# Patient Record
Sex: FEMALE | Race: WHITE | NOT HISPANIC OR LATINO | Employment: FULL TIME | URBAN - NONMETROPOLITAN AREA
[De-identification: names, ages, dates, MRNs, and addresses within clinical notes are randomized per-mention and may not be internally consistent; named-entity substitution may affect disease eponyms.]

---

## 2023-01-19 ENCOUNTER — OFFICE VISIT (OUTPATIENT)
Dept: SURGERY | Facility: CLINIC | Age: 63
End: 2023-01-19
Payer: COMMERCIAL

## 2023-01-19 VITALS
DIASTOLIC BLOOD PRESSURE: 88 MMHG | HEART RATE: 104 BPM | OXYGEN SATURATION: 99 % | SYSTOLIC BLOOD PRESSURE: 140 MMHG | TEMPERATURE: 98.2 F | WEIGHT: 210 LBS | BODY MASS INDEX: 37.21 KG/M2 | HEIGHT: 63 IN

## 2023-01-19 DIAGNOSIS — D17.21 LIPOMA OF RIGHT UPPER EXTREMITY: ICD-10-CM

## 2023-01-19 DIAGNOSIS — D17.23 LIPOMA OF RIGHT LOWER EXTREMITY: ICD-10-CM

## 2023-01-19 DIAGNOSIS — D17.22 LIPOMA OF LEFT UPPER EXTREMITY: Primary | ICD-10-CM

## 2023-01-19 DIAGNOSIS — E66.09 CLASS 2 OBESITY DUE TO EXCESS CALORIES WITHOUT SERIOUS COMORBIDITY WITH BODY MASS INDEX (BMI) OF 37.0 TO 37.9 IN ADULT: ICD-10-CM

## 2023-01-19 PROCEDURE — 99204 OFFICE O/P NEW MOD 45 MIN: CPT | Performed by: STUDENT IN AN ORGANIZED HEALTH CARE EDUCATION/TRAINING PROGRAM

## 2023-01-19 RX ORDER — HEPARIN SODIUM 5000 [USP'U]/ML
5000 INJECTION, SOLUTION INTRAVENOUS; SUBCUTANEOUS ONCE
Status: CANCELLED | OUTPATIENT
Start: 2023-01-19 | End: 2023-01-19

## 2023-01-19 RX ORDER — ASPIRIN 81 MG/1
TABLET, COATED ORAL
COMMUNITY
Start: 2022-12-13

## 2023-01-19 RX ORDER — LISINOPRIL AND HYDROCHLOROTHIAZIDE 20; 12.5 MG/1; MG/1
TABLET ORAL
COMMUNITY
Start: 2023-01-03

## 2023-01-19 RX ORDER — LEVOTHYROXINE SODIUM 112 MCG
TABLET ORAL
COMMUNITY
Start: 2023-01-03

## 2023-01-19 NOTE — PATIENT INSTRUCTIONS

## 2023-01-19 NOTE — PROGRESS NOTES
"Office New Patient History and Physical:     Referring Provider: Mor Altamirano MD    Chief Complaint   Patient presents with   • lipomas     Mrs. Borden is here for a consult on her lipomas.       Subjective .     History of present illness:  Marii Borden is a 62 y.o. female who presents with lipomas on her right forearm, two in her right upper leg, and bilateral upper arms. These have been present x 20 years. The one on her right forearm is getting better. They are sore and bother her. She has a family history of lipomas     She is a non-smoker. Her BMI is 37. She is on an 81mg ASA daily.      History  History reviewed. No pertinent past medical history.,   Past Surgical History:   Procedure Laterality Date   • APPENDECTOMY     • GALLBLADDER SURGERY     • GASTRIC BYPASS     • HYSTERECTOMY     , History reviewed. No pertinent family history.,   Social History     Tobacco Use   • Smoking status: Never   • Smokeless tobacco: Never   Vaping Use   • Vaping Use: Never used   Substance Use Topics   • Alcohol use: Defer   • Drug use: Defer   , (Not in a hospital admission)   and Allergies:  Penicillins    Current Outpatient Medications:   •  Aspirin Low Dose 81 MG EC tablet, , Disp: , Rfl:   •  lisinopril-hydrochlorothiazide (PRINZIDE,ZESTORETIC) 20-12.5 MG per tablet, , Disp: , Rfl:   •  Synthroid 112 MCG tablet, , Disp: , Rfl:     Objective     Vital Signs   /88 (BP Location: Left arm, Patient Position: Sitting, Cuff Size: Adult)   Pulse 104   Temp 98.2 °F (36.8 °C)   Ht 160 cm (63\")   Wt 95.3 kg (210 lb)   SpO2 99%   BMI 37.20 kg/m²      Physical Exam:  General appearance - alert, well appearing, and in no distress  Mental status - alert, oriented to person, place, and time  Eyes - pupils equal and reactive, extraocular eye movements intact  Neck - supple, no significant adenopathy  Chest - clear to auscultation, no wheezes, rales or rhonchi, symmetric air entry  Heart - normal rate, regular " rhythm  Abdomen - soft, nontender, nondistended, no masses or organomegaly  Neurological - alert, oriented, normal speech, no focal findings or movement disorder noted  Musculoskeletal - no joint tenderness, deformity or swelling  Skin - Right forearm 3.5 cm x 2cm, right upper arm 2cm, x 1 cm, left upper arm 2 cm x 1 cm, 4cm x 1.5 cm, 2cm x 1 cm     Results Review:    The following data was reviewed by: Vero Grant MD on 01/19/2023:    PROGRESS NOTES - SCAN by NEW ONBASE, CENTRAL (01/09/2023 00:00)    Assessment & Plan       Diagnoses and all orders for this visit:    1. Lipoma of left upper extremity (Primary)  Overview:  Added automatically from request for surgery [id number]    Orders:  -     Case Request; Standing  -     CBC (No diff); Future  -     Comprehensive metabolic panel; Future  -     ECG 12 Lead; Future  -     XR chest 1 vw; Future  -     heparin (porcine) 5000 UNIT/ML injection 5,000 Units  -     clindamycin (CLEOCIN) 600 mg in dextrose (D5W) 5 % 100 mL IVPB  -     Case Request    2. Lipoma of right upper extremity  Overview:  Added automatically from request for surgery [id number]    Orders:  -     Case Request; Standing  -     CBC (No diff); Future  -     Comprehensive metabolic panel; Future  -     ECG 12 Lead; Future  -     XR chest 1 vw; Future  -     heparin (porcine) 5000 UNIT/ML injection 5,000 Units  -     clindamycin (CLEOCIN) 600 mg in dextrose (D5W) 5 % 100 mL IVPB  -     Case Request    3. Lipoma of right lower extremity  Overview:  Added automatically from request for surgery [id number]    Orders:  -     Case Request; Standing  -     CBC (No diff); Future  -     Comprehensive metabolic panel; Future  -     ECG 12 Lead; Future  -     XR chest 1 vw; Future  -     heparin (porcine) 5000 UNIT/ML injection 5,000 Units  -     clindamycin (CLEOCIN) 600 mg in dextrose (D5W) 5 % 100 mL IVPB  -     Case Request    4. Class 2 obesity due to excess calories without serious comorbidity with body mass  index (BMI) of 37.0 to 37.9 in adult    Other orders  -     Follow Anesthesia Guidelines / Protocol; Future  -     Obtain Informed Consent; Future  -     Provide NPO Instructions to Patient; Future  -     Chlorhexidine Skin Prep; Future  -     Follow Anesthesia Guidelines / Protocol; Standing  -     Verify / Perform Chlorhexidine Skin Prep; Standing  -     Verify / Perform Chlorhexidine Skin Prep if Indicated (If Not Already Completed); Standing  -     Instructions on coughing, deep breathing, and incentive spirometry.; Standing  -     Oxygen Therapy-; Standing  -     Notify physician (specify); Standing     Ms. Borden is a 62 year old female with five symptomatic lipomas that are enlarging and uncomfortable. She would like them excised. We discussed that this does not prevent lipomas from recurring in other places. We discussed risks of excision including bleeding, infection, damage to surrounding structures and lipoma recurrence. We discussed doing this in the office under local vs. In the OR under MAC and she would like to do it in the OR. She is scheduled for 2/2/23. I have reviewed the note above. These are chronic problems with progression as they are growing. She is at increased risk of perioperative complications 2/2 her elevated BMI.      Class 2 Severe Obesity (BMI >=35 and <=39.9). Obesity-related health conditions include the following: hypertension. Obesity is unchanged. BMI is is above average; BMI management plan is completed. We discussed portion control and increasing exercise.      Vero Grant MD  01/20/23  12:00 CST

## 2023-03-21 ENCOUNTER — OFFICE VISIT (OUTPATIENT)
Age: 63
End: 2023-03-21
Payer: COMMERCIAL

## 2023-03-21 VITALS
WEIGHT: 207 LBS | DIASTOLIC BLOOD PRESSURE: 68 MMHG | TEMPERATURE: 97.1 F | HEIGHT: 67 IN | HEART RATE: 97 BPM | OXYGEN SATURATION: 96 % | RESPIRATION RATE: 20 BRPM | BODY MASS INDEX: 32.49 KG/M2 | SYSTOLIC BLOOD PRESSURE: 110 MMHG

## 2023-03-21 DIAGNOSIS — J06.9 UPPER RESPIRATORY TRACT INFECTION, UNSPECIFIED TYPE: Primary | ICD-10-CM

## 2023-03-21 PROCEDURE — 99203 OFFICE O/P NEW LOW 30 MIN: CPT | Performed by: NURSE PRACTITIONER

## 2023-03-21 RX ORDER — LEVOTHYROXINE SODIUM 112 UG/1
112 TABLET ORAL DAILY
COMMUNITY

## 2023-03-21 RX ORDER — METHYLPREDNISOLONE 4 MG/1
TABLET ORAL
Qty: 1 KIT | Refills: 0 | Status: SHIPPED | OUTPATIENT
Start: 2023-03-21 | End: 2023-03-27

## 2023-03-21 ASSESSMENT — ENCOUNTER SYMPTOMS
STRIDOR: 0
SHORTNESS OF BREATH: 0
SORE THROAT: 1
ABDOMINAL PAIN: 0
TROUBLE SWALLOWING: 0
EYE PAIN: 0
ABDOMINAL DISTENTION: 0
WHEEZING: 0
COUGH: 1
CHEST TIGHTNESS: 0
COLOR CHANGE: 0
SINUS PRESSURE: 0
EYE DISCHARGE: 0

## 2023-03-21 NOTE — PROGRESS NOTES
for this visit. Allergies   Allergen Reactions    Penicillins Hives       Health Maintenance   Topic Date Due    COVID-19 Vaccine (1) Never done    Depression Screen  Never done    HIV screen  Never done    Hepatitis C screen  Never done    DTaP/Tdap/Td vaccine (1 - Tdap) Never done    Cervical cancer screen  Never done    Lipids  Never done    Colorectal Cancer Screen  Never done    Breast cancer screen  Never done    Shingles vaccine (1 of 2) Never done    Flu vaccine (1) Never done    Hepatitis A vaccine  Aged Out    Hib vaccine  Aged Out    Meningococcal (ACWY) vaccine  Aged Out    Pneumococcal 0-64 years Vaccine  Aged Out       Subjective:   Review of Systems   Constitutional:  Positive for fatigue. Negative for chills and fever. HENT:  Positive for congestion, ear pain and sore throat. Negative for sinus pressure and trouble swallowing. Eyes:  Negative for pain and discharge. Respiratory:  Positive for cough. Negative for chest tightness, shortness of breath, wheezing and stridor. Cardiovascular:  Negative for chest pain and palpitations. Gastrointestinal:  Negative for abdominal distention and abdominal pain. Genitourinary:  Negative for difficulty urinating, dysuria and hematuria. Musculoskeletal:  Negative for arthralgias, neck pain and neck stiffness. Skin:  Negative for color change and rash. Neurological:  Negative for dizziness, syncope, speech difficulty, weakness and numbness. Psychiatric/Behavioral:  Negative for confusion and suicidal ideas. Objective    Physical Exam  Vitals and nursing note reviewed. Constitutional:       General: She is not in acute distress. Appearance: Normal appearance. HENT:      Head: Normocephalic. Right Ear: Tympanic membrane, ear canal and external ear normal.      Left Ear: Ear canal and external ear normal.      Ears:      Comments: Left TM yellow     Nose: Rhinorrhea present. No congestion.       Mouth/Throat:      Mouth:

## 2023-03-21 NOTE — PATIENT INSTRUCTIONS
Encourage fluids, Tylenol/Ibuprofen, OTC decongestants   Medrol sent to pharmacy.   If symptoms worsen or fail to improve follow-up with PCP  If SOB, chest pain, or high persistent fevers occur, go to ER    Patient verbalized understanding and agrees to plan

## 2023-11-17 ENCOUNTER — APPOINTMENT (OUTPATIENT)
Dept: GENERAL RADIOLOGY | Age: 63
End: 2023-11-17
Payer: COMMERCIAL

## 2023-11-17 ENCOUNTER — APPOINTMENT (OUTPATIENT)
Dept: CT IMAGING | Age: 63
End: 2023-11-17
Payer: COMMERCIAL

## 2023-11-17 ENCOUNTER — OFFICE VISIT (OUTPATIENT)
Age: 63
End: 2023-11-17
Payer: COMMERCIAL

## 2023-11-17 ENCOUNTER — HOSPITAL ENCOUNTER (EMERGENCY)
Age: 63
Discharge: HOME | End: 2023-11-17
Attending: EMERGENCY MEDICINE
Payer: COMMERCIAL

## 2023-11-17 VITALS
OXYGEN SATURATION: 96 % | HEART RATE: 78 BPM | SYSTOLIC BLOOD PRESSURE: 126 MMHG | RESPIRATION RATE: 18 BRPM | TEMPERATURE: 98.3 F | DIASTOLIC BLOOD PRESSURE: 76 MMHG

## 2023-11-17 VITALS
HEART RATE: 76 BPM | SYSTOLIC BLOOD PRESSURE: 120 MMHG | WEIGHT: 206 LBS | BODY MASS INDEX: 32.33 KG/M2 | RESPIRATION RATE: 20 BRPM | DIASTOLIC BLOOD PRESSURE: 72 MMHG | OXYGEN SATURATION: 97 % | TEMPERATURE: 98.3 F | HEIGHT: 67 IN

## 2023-11-17 DIAGNOSIS — R14.0 ABDOMINAL BLOATING: ICD-10-CM

## 2023-11-17 DIAGNOSIS — Z98.84 PERSONAL HISTORY OF GASTRIC BYPASS: ICD-10-CM

## 2023-11-17 DIAGNOSIS — R10.13 ABDOMINAL PAIN, EPIGASTRIC: Primary | ICD-10-CM

## 2023-11-17 DIAGNOSIS — R10.84 GENERALIZED ABDOMINAL PAIN: Primary | ICD-10-CM

## 2023-11-17 LAB
ALBUMIN SERPL-MCNC: 4.6 G/DL (ref 3.5–5.2)
ALP SERPL-CCNC: 112 U/L (ref 35–104)
ALT SERPL-CCNC: 23 U/L (ref 5–33)
ANION GAP SERPL CALCULATED.3IONS-SCNC: 11 MMOL/L (ref 7–19)
AST SERPL-CCNC: 20 U/L (ref 5–32)
BASOPHILS # BLD: 0.1 K/UL (ref 0–0.2)
BASOPHILS NFR BLD: 1 % (ref 0–1)
BILIRUB SERPL-MCNC: 0.4 MG/DL (ref 0.2–1.2)
BILIRUB UR QL STRIP: NEGATIVE
BUN SERPL-MCNC: 16 MG/DL (ref 8–23)
CALCIUM SERPL-MCNC: 10.3 MG/DL (ref 8.8–10.2)
CHLORIDE SERPL-SCNC: 99 MMOL/L (ref 98–111)
CLARITY UR: CLEAR
CO2 SERPL-SCNC: 30 MMOL/L (ref 22–29)
COLOR UR: YELLOW
CREAT SERPL-MCNC: 1 MG/DL (ref 0.5–0.9)
EOSINOPHIL # BLD: 0.2 K/UL (ref 0–0.6)
EOSINOPHIL NFR BLD: 2.5 % (ref 0–5)
ERYTHROCYTE [DISTWIDTH] IN BLOOD BY AUTOMATED COUNT: 12.7 % (ref 11.5–14.5)
GLUCOSE SERPL-MCNC: 103 MG/DL (ref 74–109)
GLUCOSE UR STRIP.AUTO-MCNC: NEGATIVE MG/DL
HCT VFR BLD AUTO: 45.5 % (ref 37–47)
HGB BLD-MCNC: 14.9 G/DL (ref 12–16)
HGB UR STRIP.AUTO-MCNC: NEGATIVE MG/L
IMM GRANULOCYTES # BLD: 0 K/UL
INR PPP: 1.02 (ref 0.88–1.18)
KETONES UR STRIP.AUTO-MCNC: NEGATIVE MG/DL
LEUKOCYTE ESTERASE UR QL STRIP.AUTO: NEGATIVE
LIPASE SERPL-CCNC: 21 U/L (ref 13–60)
LYMPHOCYTES # BLD: 2.6 K/UL (ref 1.1–4.5)
LYMPHOCYTES NFR BLD: 32.7 % (ref 20–40)
MCH RBC QN AUTO: 29 PG (ref 27–31)
MCHC RBC AUTO-ENTMCNC: 32.7 G/DL (ref 33–37)
MCV RBC AUTO: 88.7 FL (ref 81–99)
MONOCYTES # BLD: 0.8 K/UL (ref 0–0.9)
MONOCYTES NFR BLD: 10.4 % (ref 0–10)
NEUTROPHILS # BLD: 4.2 K/UL (ref 1.5–7.5)
NEUTS SEG NFR BLD: 53.3 % (ref 50–65)
NITRITE UR QL STRIP.AUTO: NEGATIVE
PH UR STRIP.AUTO: 8 [PH] (ref 5–8)
PLATELET # BLD AUTO: 214 K/UL (ref 130–400)
PMV BLD AUTO: 10.3 FL (ref 9.4–12.3)
POTASSIUM SERPL-SCNC: 4 MMOL/L (ref 3.5–5)
PROT SERPL-MCNC: 7.4 G/DL (ref 6.6–8.7)
PROT UR STRIP.AUTO-MCNC: NEGATIVE MG/DL
PROTHROMBIN TIME: 13.1 SEC (ref 12–14.6)
RBC # BLD AUTO: 5.13 M/UL (ref 4.2–5.4)
SODIUM SERPL-SCNC: 140 MMOL/L (ref 136–145)
SP GR UR STRIP.AUTO: 1.01 (ref 1–1.03)
TROPONIN, HIGH SENSITIVITY: 6 NG/L (ref 0–14)
TROPONIN, HIGH SENSITIVITY: 6 NG/L (ref 0–14)
UROBILINOGEN UR STRIP.AUTO-MCNC: 0.2 E.U./DL
WBC # BLD AUTO: 8 K/UL (ref 4.8–10.8)

## 2023-11-17 PROCEDURE — 2580000003 HC RX 258: Performed by: EMERGENCY MEDICINE

## 2023-11-17 PROCEDURE — 93005 ELECTROCARDIOGRAM TRACING: CPT | Performed by: EMERGENCY MEDICINE

## 2023-11-17 PROCEDURE — 36415 COLL VENOUS BLD VENIPUNCTURE: CPT

## 2023-11-17 PROCEDURE — 84484 ASSAY OF TROPONIN QUANT: CPT

## 2023-11-17 PROCEDURE — 74177 CT ABD & PELVIS W/CONTRAST: CPT

## 2023-11-17 PROCEDURE — 99285 EMERGENCY DEPT VISIT HI MDM: CPT

## 2023-11-17 PROCEDURE — 99203 OFFICE O/P NEW LOW 30 MIN: CPT | Performed by: NURSE PRACTITIONER

## 2023-11-17 PROCEDURE — 83690 ASSAY OF LIPASE: CPT

## 2023-11-17 PROCEDURE — 6360000002 HC RX W HCPCS: Performed by: EMERGENCY MEDICINE

## 2023-11-17 PROCEDURE — 96374 THER/PROPH/DIAG INJ IV PUSH: CPT

## 2023-11-17 PROCEDURE — 80053 COMPREHEN METABOLIC PANEL: CPT

## 2023-11-17 PROCEDURE — 85025 COMPLETE CBC W/AUTO DIFF WBC: CPT

## 2023-11-17 PROCEDURE — 81003 URINALYSIS AUTO W/O SCOPE: CPT

## 2023-11-17 PROCEDURE — 96375 TX/PRO/DX INJ NEW DRUG ADDON: CPT

## 2023-11-17 PROCEDURE — 71045 X-RAY EXAM CHEST 1 VIEW: CPT

## 2023-11-17 PROCEDURE — 85610 PROTHROMBIN TIME: CPT

## 2023-11-17 PROCEDURE — 6360000004 HC RX CONTRAST MEDICATION: Performed by: EMERGENCY MEDICINE

## 2023-11-17 RX ORDER — MORPHINE SULFATE 4 MG/ML
4 INJECTION, SOLUTION INTRAMUSCULAR; INTRAVENOUS ONCE
Status: COMPLETED | OUTPATIENT
Start: 2023-11-17 | End: 2023-11-17

## 2023-11-17 RX ORDER — PANTOPRAZOLE SODIUM 40 MG/1
40 TABLET, DELAYED RELEASE ORAL
Qty: 90 TABLET | Refills: 1 | Status: SHIPPED | OUTPATIENT
Start: 2023-11-17

## 2023-11-17 RX ORDER — ONDANSETRON 2 MG/ML
4 INJECTION INTRAMUSCULAR; INTRAVENOUS ONCE
Status: COMPLETED | OUTPATIENT
Start: 2023-11-17 | End: 2023-11-17

## 2023-11-17 RX ORDER — SUCRALFATE 1 G/1
1 TABLET ORAL 4 TIMES DAILY
Qty: 120 TABLET | Refills: 3 | Status: SHIPPED | OUTPATIENT
Start: 2023-11-17

## 2023-11-17 RX ORDER — SODIUM CHLORIDE, SODIUM LACTATE, POTASSIUM CHLORIDE, AND CALCIUM CHLORIDE .6; .31; .03; .02 G/100ML; G/100ML; G/100ML; G/100ML
1000 INJECTION, SOLUTION INTRAVENOUS ONCE
Status: COMPLETED | OUTPATIENT
Start: 2023-11-17 | End: 2023-11-17

## 2023-11-17 RX ADMIN — MORPHINE SULFATE 4 MG: 4 INJECTION, SOLUTION INTRAMUSCULAR; INTRAVENOUS at 12:37

## 2023-11-17 RX ADMIN — ONDANSETRON 4 MG: 2 INJECTION INTRAMUSCULAR; INTRAVENOUS at 12:35

## 2023-11-17 RX ADMIN — IOPAMIDOL 75 ML: 755 INJECTION, SOLUTION INTRAVENOUS at 14:36

## 2023-11-17 RX ADMIN — SODIUM CHLORIDE, POTASSIUM CHLORIDE, SODIUM LACTATE AND CALCIUM CHLORIDE 1000 ML: 600; 310; 30; 20 INJECTION, SOLUTION INTRAVENOUS at 12:46

## 2023-11-17 ASSESSMENT — ENCOUNTER SYMPTOMS
BELCHING: 1
FLATUS: 1
VOMITING: 0
ABDOMINAL DISTENTION: 1
ALLERGIC/IMMUNOLOGIC NEGATIVE: 1
DIARRHEA: 0
COUGH: 0
NAUSEA: 1
SINUS PRESSURE: 0
SORE THROAT: 0
ABDOMINAL PAIN: 1
EYES NEGATIVE: 1

## 2023-11-17 ASSESSMENT — PAIN SCALES - GENERAL: PAINLEVEL_OUTOF10: 7

## 2023-11-17 ASSESSMENT — VISUAL ACUITY: OU: 1

## 2023-11-17 ASSESSMENT — PAIN DESCRIPTION - LOCATION: LOCATION: ABDOMEN

## 2023-11-17 NOTE — ED PROVIDER NOTES
[facility] EMERGENCY DEPT  eMERGENCY dEPARTMENT eNCOUnter      Pt Name: Chica Caal  MRN: [id number]  [address] [facility], [place], operated by [facility] 1960  Date of evaluation: 11/17/2023  Provider: Joni Carroll MD    CHIEF COMPLAINT       Chief Complaint   Patient presents with    Abdominal Pain         HISTORY OF PRESENT ILLNESS   (Location/Symptom, Timing/Onset,Context/Setting, Quality, Duration, Modifying Factors, Severity)  Note limiting factors. Chica Caal is a 61 y.o. female who presents to the emergency department with abd pain since MON. 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 She has epigastric pain for 4 days worse with eating fatty foods, has no GB has had Gastric Bypass years ago. No blood in stool no emesis. Pt has what feels like gas pains in her abd. Doesn't have a MD here yet. No fever no CP no SOB. Comes in waves and worse with palpation. The history is provided by the patient. NursingNotes were reviewed. REVIEW OF SYSTEMS    (2-9 systems for level 4, 10 or more for level 5)     Review of Systems   Constitutional:  Negative for fever. HENT:  Negative for congestion. Respiratory:  Negative for cough and shortness of breath. Cardiovascular:  Negative for chest pain. Gastrointestinal:  Positive for abdominal pain and nausea. Negative for blood in stool, constipation, diarrhea and vomiting. Musculoskeletal:  Negative for back pain. Neurological:  Negative for seizures and syncope. Psychiatric/Behavioral:  Negative for confusion. A complete review of systems was performed and is negative except as noted above in the HPI.        PAST MEDICAL HISTORY     Past Medical History:   Diagnosis Date    Hypertension          SURGICAL HISTORY       Past Surgical History:   Procedure Laterality Date    GASTRIC BYPASS SURGERY  2007    KNEE ARTHROCENTESIS Left     OVARY SURGERY  1997    UTERUS SURGERY  2009         CURRENT MEDICATIONS       Discharge Medication List as of

## 2023-11-17 NOTE — PROGRESS NOTES
[address]  [address] [place] [address]  Dept: [phone]  Dept Fax: [phone]  Loc: [phone]    Radha Fraser is a 61 y.o. female who presents today for her medical conditions/complaintsas noted below. Radha Fraser is c/o of Nausea and Abdominal Pain        HPI:     Abdominal Pain  This is a new problem. The current episode started in the past 7 days (3-4 days). The onset quality is sudden. The problem occurs constantly. The problem has been gradually worsening. The pain is located in the generalized abdominal region. The pain is at a severity of 8/10. The pain is severe. The quality of the pain is cramping, sharp and aching. The abdominal pain does not radiate. Associated symptoms include belching, flatus and nausea. Pertinent negatives include no arthralgias, diarrhea, fever, headaches, myalgias or vomiting. Associated symptoms comments: Bloating. Nothing aggravates the pain. The pain is relieved by Nothing. Treatments tried: Ibuprofen. The treatment provided no relief. Her past medical history is significant for abdominal surgery. Gastric bypass     Nirav Armendariz is here with c/o abd pain, bloating, nausea for 3-4 days. She has hx of gastric bypass and says she has had diverticulitis in the past. She says it started after she ate some fried food and then [facility] along with potato chips and other foods she does not normally eat. Her abd pain is worsening and today she rates the pain as a 8 out of 10. She says she took Ibuprofen and this made her abd pain worse. She did have a BM daily but is c/o nausea. She does report some chills. Past Medical History:   Diagnosis Date    Hypertension      Past Surgical History:   Procedure Laterality Date    GASTRIC BYPASS SURGERY  2007    KNEE ARTHROCENTESIS Left     OVARY SURGERY  1997    UTERUS SURGERY  2009       No family history on file.     Social History     Tobacco Use    Smoking status:

## 2023-11-17 NOTE — DISCHARGE INSTRUCTIONS
Return to the ER for acute worsening or changes of concern. Try to avoid fatty foods and spicy foods. We will try a couple medicines see if they help again return to the ER for anything new or worsening.

## 2023-11-18 ASSESSMENT — ENCOUNTER SYMPTOMS
DIARRHEA: 0
COUGH: 0
VOMITING: 0
BLOOD IN STOOL: 0
BACK PAIN: 0
SHORTNESS OF BREATH: 0
ABDOMINAL PAIN: 1
CONSTIPATION: 0
NAUSEA: 1

## 2023-11-20 LAB
EKG P AXIS: 69 DEGREES
EKG P-R INTERVAL: 154 MS
EKG Q-T INTERVAL: 274 MS
EKG QRS DURATION: 62 MS
EKG QTC CALCULATION (BAZETT): 403 MS
EKG T AXIS: 42 DEGREES

## 2023-11-20 PROCEDURE — 93010 ELECTROCARDIOGRAM REPORT: CPT | Performed by: INTERNAL MEDICINE

## 2024-01-30 ENCOUNTER — TELEPHONE (OUTPATIENT)
Dept: GASTROENTEROLOGY | Facility: CLINIC | Age: 64
End: 2024-01-30
Payer: COMMERCIAL

## 2024-01-30 NOTE — TELEPHONE ENCOUNTER
Letter sent via my chart and printed for mail to discuss fast track option for colonoscopy screening    Please verify if any family history of colon polyps or colon cancer present as well as if Marii Borden has undergone previous colonoscopy

## 2024-02-09 DIAGNOSIS — Z12.11 ENCOUNTER FOR SCREENING FOR MALIGNANT NEOPLASM OF COLON: Primary | ICD-10-CM

## 2024-02-09 RX ORDER — SODIUM, POTASSIUM,MAG SULFATES 17.5-3.13G
177 SOLUTION, RECONSTITUTED, ORAL ORAL TAKE AS DIRECTED
Qty: 177 ML | Refills: 0 | Status: SHIPPED | OUTPATIENT
Start: 2024-02-09

## 2024-02-14 ENCOUNTER — ANESTHESIA (OUTPATIENT)
Dept: GASTROENTEROLOGY | Facility: HOSPITAL | Age: 64
End: 2024-02-14
Payer: COMMERCIAL

## 2024-02-14 ENCOUNTER — TELEPHONE (OUTPATIENT)
Dept: GASTROENTEROLOGY | Facility: CLINIC | Age: 64
End: 2024-02-14
Payer: COMMERCIAL

## 2024-02-14 ENCOUNTER — HOSPITAL ENCOUNTER (OUTPATIENT)
Facility: HOSPITAL | Age: 64
Setting detail: HOSPITAL OUTPATIENT SURGERY
Discharge: HOME | End: 2024-02-14
Attending: INTERNAL MEDICINE | Admitting: INTERNAL MEDICINE
Payer: COMMERCIAL

## 2024-02-14 ENCOUNTER — ANESTHESIA EVENT (OUTPATIENT)
Dept: GASTROENTEROLOGY | Facility: HOSPITAL | Age: 64
End: 2024-02-14
Payer: COMMERCIAL

## 2024-02-14 VITALS
TEMPERATURE: 97.5 F | WEIGHT: 196 LBS | OXYGEN SATURATION: 94 % | HEIGHT: 63 IN | SYSTOLIC BLOOD PRESSURE: 105 MMHG | RESPIRATION RATE: 21 BRPM | DIASTOLIC BLOOD PRESSURE: 65 MMHG | HEART RATE: 97 BPM | BODY MASS INDEX: 34.73 KG/M2

## 2024-02-14 DIAGNOSIS — Z12.11 ENCOUNTER FOR SCREENING FOR MALIGNANT NEOPLASM OF COLON: ICD-10-CM

## 2024-02-14 PROCEDURE — 25010000002 PROPOFOL 1000 MG/100ML EMULSION

## 2024-02-14 PROCEDURE — 25810000003 SODIUM CHLORIDE 0.9 % SOLUTION: Performed by: ANESTHESIOLOGY

## 2024-02-14 PROCEDURE — 45385 COLONOSCOPY W/LESION REMOVAL: CPT | Performed by: INTERNAL MEDICINE

## 2024-02-14 RX ORDER — PROPOFOL 10 MG/ML
INJECTION, EMULSION INTRAVENOUS AS NEEDED
Status: DISCONTINUED | OUTPATIENT
Start: 2024-02-14 | End: 2024-02-14 | Stop reason: SURG

## 2024-02-14 RX ORDER — SODIUM CHLORIDE 0.9 % (FLUSH) 0.9 %
10 SYRINGE (ML) INJECTION AS NEEDED
Status: DISCONTINUED | OUTPATIENT
Start: 2024-02-14 | End: 2024-02-14 | Stop reason: HOSPADM

## 2024-02-14 RX ORDER — SODIUM CHLORIDE 9 MG/ML
500 INJECTION, SOLUTION INTRAVENOUS CONTINUOUS PRN
Status: DISCONTINUED | OUTPATIENT
Start: 2024-02-14 | End: 2024-02-14 | Stop reason: HOSPADM

## 2024-02-14 RX ORDER — LIDOCAINE HYDROCHLORIDE 10 MG/ML
0.5 INJECTION, SOLUTION EPIDURAL; INFILTRATION; INTRACAUDAL; PERINEURAL ONCE AS NEEDED
Status: DISCONTINUED | OUTPATIENT
Start: 2024-02-14 | End: 2024-02-14 | Stop reason: HOSPADM

## 2024-02-14 RX ADMIN — LIDOCAINE HYDROCHLORIDE 50 MG: 20 INJECTION, SOLUTION INTRAVENOUS at 09:32

## 2024-02-14 RX ADMIN — SODIUM CHLORIDE 500 ML: 9 INJECTION, SOLUTION INTRAVENOUS at 08:23

## 2024-02-14 RX ADMIN — PROPOFOL 200 MG: 10 INJECTION, EMULSION INTRAVENOUS at 09:32

## 2024-02-14 RX ADMIN — PROPOFOL 100 MG: 10 INJECTION, EMULSION INTRAVENOUS at 09:46

## 2024-02-14 RX ADMIN — PROPOFOL 200 MG: 10 INJECTION, EMULSION INTRAVENOUS at 09:39

## 2024-02-14 NOTE — H&P
"[facility] Gastroenterology  Pre Procedure History & Physical    Chief Complaint:   Polyps    Subjective     HPI:   polyps    Past Medical History:   History reviewed. No pertinent past medical history.    Past Surgical History:  Past Surgical History:   Procedure Laterality Date    APPENDECTOMY      GALLBLADDER SURGERY      GASTRIC BYPASS      HYSTERECTOMY      REPLACEMENT TOTAL KNEE Left        Family History:  History reviewed. No pertinent family history.    Social History:   reports that she has never smoked. She has never used smokeless tobacco. Alcohol use questions deferred to the physician. Drug use questions deferred to the physician.    Medications:   Prior to Admission medications    Medication Sig Start Date End Date Taking? Authorizing Provider   lisinopril-hydrochlorothiazide (PRINZIDE,ZESTORETIC) 20-12.5 MG per tablet  1/3/23  Yes Joseph Zamora MD   sodium-potassium-magnesium sulfates (Suprep Bowel Prep Kit) 17.5-3.13-1.6 GM/177ML solution oral solution Take 1 bottle by mouth Take As Directed. Take as directed 2/9/24  Yes Ambrosio Hughes APRN   Synthroid 112 MCG tablet Daily. 1/3/23  Yes Joseph Zamora MD   Aspirin Low Dose 81 MG EC tablet  12/13/22   Joseph Zamora MD       Allergies:  Penicillins    ROS:    General: Weight stable  Resp: No SOA  Cardiovascular: No CP    Objective     Blood pressure 136/76, pulse 83, temperature 97.5 °F (36.4 °C), temperature source Temporal, resp. rate 20, height 160 cm (63\"), weight 88.9 kg (196 lb), SpO2 98%.    Physical Exam   Constitutional: Pt is oriented to person, place, and in no distress.   HENT: Mouth/Throat: Oropharynx is clear.   Cardiovascular: Normal rate, regular rhythm.    Pulmonary/Chest: Effort normal. No respiratory distress. No  wheezes.   Abdominal: Soft. Non-distended.  Skin: Skin is warm and dry.   Psychiatric: Mood, memory, affect and judgment appear normal.     Assessment & Plan "     Diagnosis:  Polyps    Anticipated Surgical Procedure:  C-scope    The risks, benefits, and alternatives of this procedure have been discussed with the patient or the responsible party- the patient understands and agrees to proceed.

## 2024-02-26 ENCOUNTER — HOSPITAL ENCOUNTER (EMERGENCY)
Age: 64
Discharge: HOME | End: 2024-02-26
Payer: COMMERCIAL

## 2024-02-26 ENCOUNTER — APPOINTMENT (OUTPATIENT)
Dept: GENERAL RADIOLOGY | Age: 64
End: 2024-02-26
Payer: COMMERCIAL

## 2024-02-26 ENCOUNTER — APPOINTMENT (OUTPATIENT)
Dept: CT IMAGING | Age: 64
End: 2024-02-26
Payer: COMMERCIAL

## 2024-02-26 VITALS
OXYGEN SATURATION: 99 % | WEIGHT: 205.03 LBS | SYSTOLIC BLOOD PRESSURE: 140 MMHG | BODY MASS INDEX: 32.11 KG/M2 | RESPIRATION RATE: 15 BRPM | DIASTOLIC BLOOD PRESSURE: 74 MMHG | TEMPERATURE: 98.1 F | HEART RATE: 68 BPM

## 2024-02-26 DIAGNOSIS — S62.145A CLOSED NONDISPLACED FRACTURE OF HAMATE BONE OF LEFT WRIST, UNSPECIFIED PORTION OF HAMATE, INITIAL ENCOUNTER: ICD-10-CM

## 2024-02-26 DIAGNOSIS — S60.222A CONTUSION OF LEFT HAND, INITIAL ENCOUNTER: Primary | ICD-10-CM

## 2024-02-26 PROCEDURE — 73130 X-RAY EXAM OF HAND: CPT

## 2024-02-26 PROCEDURE — 99284 EMERGENCY DEPT VISIT MOD MDM: CPT

## 2024-02-26 PROCEDURE — 73110 X-RAY EXAM OF WRIST: CPT

## 2024-02-26 PROCEDURE — 6370000000 HC RX 637 (ALT 250 FOR IP): Performed by: NURSE PRACTITIONER

## 2024-02-26 PROCEDURE — 73200 CT UPPER EXTREMITY W/O DYE: CPT

## 2024-02-26 RX ORDER — HYDROCODONE BITARTRATE AND ACETAMINOPHEN 5; 325 MG/1; MG/1
1 TABLET ORAL EVERY 6 HOURS PRN
Qty: 12 TABLET | Refills: 0 | Status: SHIPPED | OUTPATIENT
Start: 2024-02-26 | End: 2024-02-29

## 2024-02-26 RX ORDER — OXYCODONE HYDROCHLORIDE AND ACETAMINOPHEN 5; 325 MG/1; MG/1
2 TABLET ORAL ONCE
Status: COMPLETED | OUTPATIENT
Start: 2024-02-26 | End: 2024-02-26

## 2024-02-26 RX ADMIN — OXYCODONE HYDROCHLORIDE AND ACETAMINOPHEN 2 TABLET: 5; 325 TABLET ORAL at 11:02

## 2024-02-26 ASSESSMENT — PAIN SCALES - GENERAL
PAINLEVEL_OUTOF10: 0
PAINLEVEL_OUTOF10: 2
PAINLEVEL_OUTOF10: 6

## 2024-02-26 ASSESSMENT — PAIN DESCRIPTION - DESCRIPTORS: DESCRIPTORS: ACHING

## 2024-02-26 ASSESSMENT — ENCOUNTER SYMPTOMS
COLOR CHANGE: 1
GASTROINTESTINAL NEGATIVE: 1

## 2024-02-26 ASSESSMENT — PAIN DESCRIPTION - ORIENTATION
ORIENTATION: LEFT
ORIENTATION: LEFT

## 2024-02-26 ASSESSMENT — PAIN - FUNCTIONAL ASSESSMENT: PAIN_FUNCTIONAL_ASSESSMENT: NONE - DENIES PAIN

## 2024-02-26 ASSESSMENT — PAIN DESCRIPTION - LOCATION
LOCATION: HAND
LOCATION: HAND

## 2024-02-26 NOTE — ED NOTES
Left hand splint applied by provider.  Pt tolerated well.  Good CMS's/neuro's noted after application.

## 2024-02-26 NOTE — ED PROVIDER NOTES
cardiologist.        RADIOLOGY:   Non-plain film images such as CT, Ultrasound and MRI are read by the radiologist. Plainradiographic images are visualized and preliminarily interpreted by the emergency physician with the below findings:        Interpretation per the Radiologist below, if available at the time of this note:    CT HAND LEFT WO CONTRAST   Final Result       1. Nondisplaced fracture of the hamate suspected series 605 image 23.  Questionable nondisplaced fracture of the fifth metacarpal base versus subcortical cyst-like change coronal image 21.   2. Osteoarthritis thumb and scaphoid multangular joint.           All CT scans are performed using dose optimization techniques as appropriate to the performed exam and includes at least one of the following:  Automated exposure control, adjustment of the mA and/or kV according to size, and the use of iterative    reconstruction technique.        All CT scans are performed using dose optimization techniques as appropriate to the performed exam and include    at least one of the following: Automated exposure control, adjustment of the mA and/or kV according to size, and the use of iterative reconstruction technique.        ______________________________________    Electronically signed by: KALEB SANTO M.D.   Date:     02/26/2024   Time:    14:15       XR HAND LEFT (MIN 3 VIEWS)   Final Result   1.  Lucency along the medial aspect of the hamate and base of the fifth metacarpal is most likely projectional.  If there is point tenderness at this area, consider CT to exclude fracture.   2.  Osteoarthritis, severe at the first CMC and fourth DIP joints.           ______________________________________    Electronically signed by: STEVE NICHOLAS M.D.   Date:     02/26/2024   Time:    10:54       XR WRIST LEFT (MIN 3 VIEWS)   Final Result   No acute fracture, dislocation, or suspicious radiopaque foreign body.               ______________________________________

## 2024-02-26 NOTE — DISCHARGE INSTRUCTIONS
Wear splint until follow up with ORTHO.  Call Ortho for follow up appointment.   ICE to painful area.  Return to ER for any new, worsening, or change in condition.   Medication as prescribed.

## (undated) DEVICE — SNAR POLYP CAPTIVATOR MICROHEX 13 240CM

## (undated) DEVICE — SENSR O2 OXIMAX FNGR A/ 18IN NONSTR

## (undated) DEVICE — TRAP POLYP ETRAP 2PK

## (undated) DEVICE — MSK O2 CONCENTR/MD CONN/UNIV A/ 7FT DISP

## (undated) DEVICE — KT VLV BIOP DEFENDO SXN AIR/WATER

## (undated) DEVICE — BRSH CLN CH 1.7MM 230CM 5TO7MM

## (undated) DEVICE — TP SXN YANKR W/VENT STRL